# Patient Record
Sex: MALE | Race: WHITE | Employment: OTHER | URBAN - NONMETROPOLITAN AREA
[De-identification: names, ages, dates, MRNs, and addresses within clinical notes are randomized per-mention and may not be internally consistent; named-entity substitution may affect disease eponyms.]

---

## 2017-01-26 ENCOUNTER — OFFICE VISIT (OUTPATIENT)
Dept: AUDIOLOGY | Age: 77
End: 2017-01-26

## 2017-01-26 ENCOUNTER — OFFICE VISIT (OUTPATIENT)
Dept: FAMILY MEDICINE CLINIC | Age: 77
End: 2017-01-26

## 2017-01-26 VITALS
WEIGHT: 224.4 LBS | HEART RATE: 88 BPM | TEMPERATURE: 97.9 F | RESPIRATION RATE: 24 BRPM | DIASTOLIC BLOOD PRESSURE: 62 MMHG | SYSTOLIC BLOOD PRESSURE: 98 MMHG | BODY MASS INDEX: 31.74 KG/M2

## 2017-01-26 DIAGNOSIS — H90.3 SENSORINEURAL HEARING LOSS, BILATERAL: Primary | ICD-10-CM

## 2017-01-26 DIAGNOSIS — H61.21 IMPACTED CERUMEN OF RIGHT EAR: Primary | ICD-10-CM

## 2017-01-26 PROCEDURE — 1036F TOBACCO NON-USER: CPT | Performed by: FAMILY MEDICINE

## 2017-01-26 PROCEDURE — 69210 REMOVE IMPACTED EAR WAX UNI: CPT | Performed by: FAMILY MEDICINE

## 2017-02-03 RX ORDER — LISINOPRIL 20 MG/1
TABLET ORAL
Qty: 90 TABLET | Refills: 3 | Status: SHIPPED | OUTPATIENT
Start: 2017-02-03 | End: 2018-01-29 | Stop reason: SDUPTHER

## 2017-02-17 RX ORDER — ALLOPURINOL 100 MG/1
TABLET ORAL
Qty: 90 TABLET | Refills: 2 | Status: SHIPPED | OUTPATIENT
Start: 2017-02-17 | End: 2017-11-14 | Stop reason: SDUPTHER

## 2017-04-21 RX ORDER — GLIMEPIRIDE 2 MG/1
TABLET ORAL
Qty: 90 TABLET | Refills: 2 | Status: SHIPPED | OUTPATIENT
Start: 2017-04-21 | End: 2018-01-16 | Stop reason: SDUPTHER

## 2017-05-18 RX ORDER — MELOXICAM 15 MG/1
TABLET ORAL
Qty: 90 TABLET | Refills: 1 | Status: SHIPPED | OUTPATIENT
Start: 2017-05-18 | End: 2017-11-14 | Stop reason: SDUPTHER

## 2017-09-21 ENCOUNTER — OFFICE VISIT (OUTPATIENT)
Dept: FAMILY MEDICINE CLINIC | Age: 77
End: 2017-09-21
Payer: MEDICARE

## 2017-09-21 VITALS
WEIGHT: 225.6 LBS | BODY MASS INDEX: 34.19 KG/M2 | HEART RATE: 88 BPM | SYSTOLIC BLOOD PRESSURE: 132 MMHG | TEMPERATURE: 98 F | DIASTOLIC BLOOD PRESSURE: 68 MMHG | RESPIRATION RATE: 18 BRPM | HEIGHT: 68 IN

## 2017-09-21 DIAGNOSIS — I10 ESSENTIAL HYPERTENSION: Chronic | ICD-10-CM

## 2017-09-21 DIAGNOSIS — R19.7 DIARRHEA, UNSPECIFIED TYPE: ICD-10-CM

## 2017-09-21 DIAGNOSIS — L03.311 CELLULITIS OF ABDOMINAL WALL: Primary | ICD-10-CM

## 2017-09-21 PROCEDURE — 1036F TOBACCO NON-USER: CPT | Performed by: FAMILY MEDICINE

## 2017-09-21 PROCEDURE — 1123F ACP DISCUSS/DSCN MKR DOCD: CPT | Performed by: FAMILY MEDICINE

## 2017-09-21 PROCEDURE — 99213 OFFICE O/P EST LOW 20 MIN: CPT | Performed by: FAMILY MEDICINE

## 2017-09-21 PROCEDURE — 4040F PNEUMOC VAC/ADMIN/RCVD: CPT | Performed by: FAMILY MEDICINE

## 2017-09-21 PROCEDURE — G8427 DOCREV CUR MEDS BY ELIG CLIN: HCPCS | Performed by: FAMILY MEDICINE

## 2017-09-21 PROCEDURE — G8417 CALC BMI ABV UP PARAM F/U: HCPCS | Performed by: FAMILY MEDICINE

## 2017-09-21 RX ORDER — COLESEVELAM 180 1/1
1250 TABLET ORAL 2 TIMES DAILY WITH MEALS
Qty: 120 TABLET | Refills: 5 | Status: SHIPPED | OUTPATIENT
Start: 2017-09-21 | End: 2017-11-13 | Stop reason: CLARIF

## 2017-09-22 LAB
ABSOLUTE BASO #: 0.1 K/UL (ref 0–0.1)
ABSOLUTE EOS #: 0.3 K/UL (ref 0.1–0.4)
ABSOLUTE LYMPH #: 3.1 K/UL (ref 0.8–5.2)
ABSOLUTE MONO #: 0.8 K/UL (ref 0.1–0.9)
ABSOLUTE NEUT #: 4.9 K/UL (ref 1.3–9.1)
ALBUMIN SERPL-MCNC: 4.5 G/DL (ref 3.2–5.3)
ALK PHOSPHATASE: 106 IU/L (ref 35–121)
ALT SERPL-CCNC: 25 IU/L (ref 5–59)
ANION GAP SERPL CALCULATED.3IONS-SCNC: 14 MMOL/L
AST SERPL-CCNC: 21 IU/L (ref 10–42)
BASOPHILS RELATIVE PERCENT: 0.8 %
BILIRUB SERPL-MCNC: 0.7 MG/DL (ref 0.2–1.3)
BUN BLDV-MCNC: 15 MG/DL (ref 10–25)
CALCIUM SERPL-MCNC: 10 MG/DL (ref 8.7–10.8)
CHLORIDE BLD-SCNC: 103 MMOL/L (ref 95–111)
CHOLESTEROL/HDL RATIO: 4.9
CHOLESTEROL: 170 MG/DL
CO2: 27 MMOL/L (ref 21–32)
CREAT SERPL-MCNC: 0.9 MG/DL (ref 0.7–1.5)
EGFR AFRICAN AMERICAN: 99
EGFR IF NONAFRICAN AMERICAN: 82
EOSINOPHILS RELATIVE PERCENT: 3.6 %
GLUCOSE: 192 MG/DL (ref 70–100)
HCT VFR BLD CALC: 48.4 % (ref 41.4–51)
HDLC SERPL-MCNC: 35 MG/DL (ref 40–60)
HEMOGLOBIN: 16.4 G/DL (ref 13.8–17)
LDL CHOLESTEROL CALCULATED: 97 MG/DL
LDL/HDL RATIO: 2.8
LYMPHOCYTE %: 33 %
MCH RBC QN AUTO: 30.3 PG (ref 27–34)
MCHC RBC AUTO-ENTMCNC: 33.9 G/DL (ref 31–36)
MCV RBC AUTO: 89.3 FL (ref 80–100)
MONOCYTES # BLD: 8.9 %
NEUTROPHILS RELATIVE PERCENT: 53.4 %
PDW BLD-RTO: 12.2 % (ref 10.8–14.8)
PLATELETS: 231 K/UL (ref 150–450)
POTASSIUM SERPL-SCNC: 4.6 MMOL/L (ref 3.5–5.4)
RBC: 5.42 M/UL (ref 4–5.5)
SODIUM BLD-SCNC: 139 MMOL/L (ref 134–147)
TOTAL PROTEIN: 6.6 G/DL (ref 5.8–8)
TRIGL SERPL-MCNC: 189 MG/DL
VLDLC SERPL CALC-MCNC: 38 MG/DL
WBC: 9.2 K/UL (ref 3.7–10.8)

## 2017-09-23 LAB — AEROBIC CULTURE: NORMAL

## 2017-09-24 ASSESSMENT — ENCOUNTER SYMPTOMS
SORE THROAT: 0
SINUS PRESSURE: 0
COUGH: 0
EYE PAIN: 0
SHORTNESS OF BREATH: 0
CONSTIPATION: 0
DIARRHEA: 0
NAUSEA: 0
ABDOMINAL PAIN: 0
ABDOMINAL DISTENTION: 0
RHINORRHEA: 0

## 2017-09-28 ENCOUNTER — TELEPHONE (OUTPATIENT)
Dept: FAMILY MEDICINE CLINIC | Age: 77
End: 2017-09-28

## 2017-09-28 RX ORDER — AMOXICILLIN 500 MG/1
500 CAPSULE ORAL 3 TIMES DAILY
Qty: 30 CAPSULE | Refills: 0 | Status: SHIPPED | OUTPATIENT
Start: 2017-09-28 | End: 2017-10-08

## 2017-11-13 ENCOUNTER — TELEPHONE (OUTPATIENT)
Dept: FAMILY MEDICINE CLINIC | Age: 77
End: 2017-11-13

## 2017-11-13 RX ORDER — MONTELUKAST SODIUM 4 MG/1
1 TABLET, CHEWABLE ORAL 2 TIMES DAILY
Qty: 60 TABLET | Refills: 2 | Status: SHIPPED | OUTPATIENT
Start: 2017-11-13 | End: 2018-03-29 | Stop reason: SDUPTHER

## 2017-11-13 RX ORDER — AMOXICILLIN 500 MG/1
500 CAPSULE ORAL 3 TIMES DAILY
Qty: 30 CAPSULE | Refills: 0 | Status: SHIPPED | OUTPATIENT
Start: 2017-11-13 | End: 2017-11-23

## 2017-11-13 NOTE — TELEPHONE ENCOUNTER
The patient called and stated that he was prescribed Welchol on 9/21/17 and it was denied by his insurance co.  We did receive a denial on this on 10/21/17 and it is scanned into the chart. The patient is wanting to know if there is something else that can be prescribed instead.     Last office visit 9/21/17

## 2017-11-13 NOTE — TELEPHONE ENCOUNTER
The patient called in stating that he has a cough, chest congestion, coughing up yellow sputum, headache and sore throat. He states that his \"cough is horrible\". Started 4 days ago. He is wanting something called in to his pharmacy. Last office visit 9/21/17    The patient to call his pharmacy.

## 2017-11-14 RX ORDER — MELOXICAM 15 MG/1
TABLET ORAL
Qty: 90 TABLET | Refills: 2 | Status: SHIPPED | OUTPATIENT
Start: 2017-11-14 | End: 2018-08-11 | Stop reason: SDUPTHER

## 2017-11-14 RX ORDER — ALLOPURINOL 100 MG/1
TABLET ORAL
Qty: 90 TABLET | Refills: 2 | Status: SHIPPED | OUTPATIENT
Start: 2017-11-14 | End: 2018-08-11 | Stop reason: SDUPTHER

## 2017-12-28 ENCOUNTER — OFFICE VISIT (OUTPATIENT)
Dept: FAMILY MEDICINE CLINIC | Age: 77
End: 2017-12-28
Payer: MEDICARE

## 2017-12-28 VITALS
SYSTOLIC BLOOD PRESSURE: 120 MMHG | WEIGHT: 228 LBS | HEART RATE: 72 BPM | RESPIRATION RATE: 20 BRPM | TEMPERATURE: 98.4 F | BODY MASS INDEX: 34.67 KG/M2 | DIASTOLIC BLOOD PRESSURE: 64 MMHG

## 2017-12-28 DIAGNOSIS — J01.90 ACUTE BACTERIAL SINUSITIS: Primary | ICD-10-CM

## 2017-12-28 DIAGNOSIS — B96.89 ACUTE BACTERIAL SINUSITIS: Primary | ICD-10-CM

## 2017-12-28 PROCEDURE — 4040F PNEUMOC VAC/ADMIN/RCVD: CPT | Performed by: FAMILY MEDICINE

## 2017-12-28 PROCEDURE — 1123F ACP DISCUSS/DSCN MKR DOCD: CPT | Performed by: FAMILY MEDICINE

## 2017-12-28 PROCEDURE — 1036F TOBACCO NON-USER: CPT | Performed by: FAMILY MEDICINE

## 2017-12-28 PROCEDURE — 99213 OFFICE O/P EST LOW 20 MIN: CPT | Performed by: FAMILY MEDICINE

## 2017-12-28 PROCEDURE — G8417 CALC BMI ABV UP PARAM F/U: HCPCS | Performed by: FAMILY MEDICINE

## 2017-12-28 PROCEDURE — G8484 FLU IMMUNIZE NO ADMIN: HCPCS | Performed by: FAMILY MEDICINE

## 2017-12-28 PROCEDURE — G8427 DOCREV CUR MEDS BY ELIG CLIN: HCPCS | Performed by: FAMILY MEDICINE

## 2017-12-28 RX ORDER — DOXYCYCLINE HYCLATE 100 MG
100 TABLET ORAL 2 TIMES DAILY
Qty: 20 TABLET | Refills: 0 | Status: SHIPPED | OUTPATIENT
Start: 2017-12-28 | End: 2018-01-07

## 2017-12-29 ASSESSMENT — ENCOUNTER SYMPTOMS
SINUS PAIN: 1
SHORTNESS OF BREATH: 0
EYE PAIN: 0
ABDOMINAL DISTENTION: 0
ABDOMINAL PAIN: 0
COUGH: 1
CONSTIPATION: 0
RHINORRHEA: 1
SINUS PRESSURE: 1
SORE THROAT: 1
DIARRHEA: 0
NAUSEA: 0

## 2017-12-29 NOTE — PROGRESS NOTES
Neurological: He is alert and oriented to person, place, and time. Skin: Skin is warm and dry. He is not diaphoretic. No pallor. /64   Pulse 72   Temp 98.4 °F (36.9 °C) (Oral)   Resp 20   Wt 228 lb (103.4 kg)   BMI 34.67 kg/m²       Impression/Plan:  1. Acute bacterial sinusitis      Requested Prescriptions     Signed Prescriptions Disp Refills    doxycycline hyclate (VIBRA-TABS) 100 MG tablet 20 tablet 0     Sig: Take 1 tablet by mouth 2 times daily for 10 days     No orders of the defined types were placed in this encounter. (Colestid for diarrhea  Patient given educational materials - see patient instructions. Discussed use, benefit, and side effects of prescribed medications. All patient questions answered. Pt voiced understanding. Reviewed health maintenance. Patient agreed with treatment plan. Follow up as directed. **This report has been created using voice recognition software. It may contain minor errors which are inherent in voice recognition technology. **       Electronically signed by Nelly Roman MD on 12/29/2017 at 2:21 PM

## 2018-01-16 RX ORDER — GLIMEPIRIDE 2 MG/1
TABLET ORAL
Qty: 90 TABLET | Refills: 2 | Status: SHIPPED | OUTPATIENT
Start: 2018-01-16 | End: 2018-10-15 | Stop reason: SDUPTHER

## 2018-01-29 RX ORDER — LISINOPRIL 20 MG/1
TABLET ORAL
Qty: 90 TABLET | Refills: 2 | Status: SHIPPED | OUTPATIENT
Start: 2018-01-29 | End: 2018-10-28 | Stop reason: SDUPTHER

## 2018-02-12 ENCOUNTER — TELEPHONE (OUTPATIENT)
Dept: FAMILY MEDICINE CLINIC | Age: 78
End: 2018-02-12

## 2018-02-12 RX ORDER — AMOXICILLIN 500 MG/1
500 CAPSULE ORAL 3 TIMES DAILY
Qty: 30 CAPSULE | Refills: 0 | Status: SHIPPED | OUTPATIENT
Start: 2018-02-12 | End: 2018-02-22

## 2018-02-12 NOTE — TELEPHONE ENCOUNTER
Facial and eye pressure, teeth hurt. Has gone thru 2 boxes of kleenex, productive cough. [facility] is full today. Requesting treatment.      Radha  [facility] [place]

## 2018-03-19 ENCOUNTER — TELEPHONE (OUTPATIENT)
Dept: FAMILY MEDICINE CLINIC | Age: 78
End: 2018-03-19

## 2018-03-23 ENCOUNTER — TELEPHONE (OUTPATIENT)
Dept: FAMILY MEDICINE CLINIC | Age: 78
End: 2018-03-23

## 2018-03-26 NOTE — TELEPHONE ENCOUNTER
Pt left a voicemail on nurse line stating to send a Rx to [facility], but did not say what he is requesting. I left another message for him to call back to discuss or let us know what he is needing.

## 2018-03-29 RX ORDER — MONTELUKAST SODIUM 4 MG/1
1 TABLET, CHEWABLE ORAL 2 TIMES DAILY
Qty: 60 TABLET | Refills: 5 | Status: SHIPPED | OUTPATIENT
Start: 2018-03-29 | End: 2018-11-01 | Stop reason: SDUPTHER

## 2018-05-15 ENCOUNTER — OFFICE VISIT (OUTPATIENT)
Dept: FAMILY MEDICINE CLINIC | Age: 78
End: 2018-05-15
Payer: MEDICARE

## 2018-05-15 ENCOUNTER — TELEPHONE (OUTPATIENT)
Dept: FAMILY MEDICINE CLINIC | Age: 78
End: 2018-05-15

## 2018-05-15 VITALS
TEMPERATURE: 98.7 F | SYSTOLIC BLOOD PRESSURE: 138 MMHG | RESPIRATION RATE: 16 BRPM | HEART RATE: 68 BPM | DIASTOLIC BLOOD PRESSURE: 60 MMHG | HEIGHT: 68 IN | WEIGHT: 228.6 LBS | BODY MASS INDEX: 34.65 KG/M2

## 2018-05-15 DIAGNOSIS — E11.9 TYPE II OR UNSPECIFIED TYPE DIABETES MELLITUS WITHOUT MENTION OF COMPLICATION, NOT STATED AS UNCONTROLLED: ICD-10-CM

## 2018-05-15 DIAGNOSIS — J01.90 ACUTE NON-RECURRENT SINUSITIS, UNSPECIFIED LOCATION: Primary | ICD-10-CM

## 2018-05-15 DIAGNOSIS — E11.9 CONTROLLED TYPE 2 DIABETES MELLITUS WITHOUT COMPLICATION, WITHOUT LONG-TERM CURRENT USE OF INSULIN (HCC): ICD-10-CM

## 2018-05-15 DIAGNOSIS — E11.9 TYPE 2 DIABETES MELLITUS WITHOUT COMPLICATION, WITHOUT LONG-TERM CURRENT USE OF INSULIN (HCC): ICD-10-CM

## 2018-05-15 DIAGNOSIS — Z00.00 LABORATORY EXAMINATION ORDERED AS PART OF A ROUTINE GENERAL MEDICAL EXAMINATION: ICD-10-CM

## 2018-05-15 DIAGNOSIS — I10 ESSENTIAL HYPERTENSION: Chronic | ICD-10-CM

## 2018-05-15 DIAGNOSIS — E11.9 CONTROLLED TYPE 2 DIABETES MELLITUS WITHOUT COMPLICATION, WITHOUT LONG-TERM CURRENT USE OF INSULIN (HCC): Primary | ICD-10-CM

## 2018-05-15 PROCEDURE — G8427 DOCREV CUR MEDS BY ELIG CLIN: HCPCS | Performed by: FAMILY MEDICINE

## 2018-05-15 PROCEDURE — G8417 CALC BMI ABV UP PARAM F/U: HCPCS | Performed by: FAMILY MEDICINE

## 2018-05-15 PROCEDURE — 1123F ACP DISCUSS/DSCN MKR DOCD: CPT | Performed by: FAMILY MEDICINE

## 2018-05-15 PROCEDURE — 1036F TOBACCO NON-USER: CPT | Performed by: FAMILY MEDICINE

## 2018-05-15 PROCEDURE — 99213 OFFICE O/P EST LOW 20 MIN: CPT | Performed by: FAMILY MEDICINE

## 2018-05-15 PROCEDURE — 4040F PNEUMOC VAC/ADMIN/RCVD: CPT | Performed by: FAMILY MEDICINE

## 2018-05-15 RX ORDER — AZITHROMYCIN 500 MG/1
500 TABLET, FILM COATED ORAL DAILY
Qty: 3 TABLET | Refills: 0 | Status: SHIPPED | OUTPATIENT
Start: 2018-05-15 | End: 2018-05-18

## 2018-05-15 ASSESSMENT — PATIENT HEALTH QUESTIONNAIRE - PHQ9
1. LITTLE INTEREST OR PLEASURE IN DOING THINGS: 0
SUM OF ALL RESPONSES TO PHQ9 QUESTIONS 1 & 2: 0
2. FEELING DOWN, DEPRESSED OR HOPELESS: 0
SUM OF ALL RESPONSES TO PHQ QUESTIONS 1-9: 0

## 2018-05-15 ASSESSMENT — ENCOUNTER SYMPTOMS
EYE PAIN: 0
RHINORRHEA: 1
ABDOMINAL PAIN: 0
SORE THROAT: 1
NAUSEA: 0
DIARRHEA: 0
SINUS PRESSURE: 1
COUGH: 1
SHORTNESS OF BREATH: 0
ABDOMINAL DISTENTION: 0
CONSTIPATION: 0

## 2018-08-13 RX ORDER — ALLOPURINOL 100 MG/1
TABLET ORAL
Qty: 90 TABLET | Refills: 2 | Status: SHIPPED | OUTPATIENT
Start: 2018-08-13 | End: 2019-05-12 | Stop reason: SDUPTHER

## 2018-08-13 RX ORDER — MELOXICAM 15 MG/1
TABLET ORAL
Qty: 90 TABLET | Refills: 2 | Status: SHIPPED | OUTPATIENT
Start: 2018-08-13 | End: 2019-05-12 | Stop reason: SDUPTHER

## 2018-10-04 ENCOUNTER — CARE COORDINATION (OUTPATIENT)
Dept: CARE COORDINATION | Age: 78
End: 2018-10-04

## 2018-10-15 RX ORDER — GLIMEPIRIDE 2 MG/1
TABLET ORAL
Qty: 90 TABLET | Refills: 2 | Status: SHIPPED | OUTPATIENT
Start: 2018-10-15

## 2018-10-29 ENCOUNTER — TELEPHONE (OUTPATIENT)
Dept: FAMILY MEDICINE CLINIC | Age: 78
End: 2018-10-29

## 2018-10-30 LAB
ALBUMIN SERPL-MCNC: 4.5 G/DL (ref 3.5–5.2)
ALK PHOSPHATASE: 101 U/L (ref 39–118)
ALT SERPL-CCNC: 21 U/L (ref 5–50)
ANION GAP SERPL CALCULATED.3IONS-SCNC: 10 MEQ/L (ref 10–19)
AST SERPL-CCNC: 19 U/L (ref 9–50)
AVERAGE GLUCOSE: 128 MG/DL (ref 66–114)
BILIRUB SERPL-MCNC: 0.8 MG/DL
BUN BLDV-MCNC: 12 MG/DL (ref 8–23)
CALCIUM SERPL-MCNC: 9.5 MG/DL (ref 8.5–10.5)
CHLORIDE BLD-SCNC: 101 MEQ/L (ref 95–107)
CHOLESTEROL/HDL RATIO: 4.2
CHOLESTEROL: 152 MG/DL
CO2: 28 MEQ/L (ref 19–31)
CREAT SERPL-MCNC: 1 MG/DL (ref 0.8–1.4)
EGFR AFRICAN AMERICAN: 83.8 ML/MIN/1.73 M2
EGFR IF NONAFRICAN AMERICAN: 72.3 ML/MIN/1.73 M2
GLUCOSE: 133 MG/DL (ref 70–99)
HBA1C MFR BLD: 6.1 % (ref 4.2–5.8)
HDLC SERPL-MCNC: 35.8 MG/DL
LDL CHOLESTEROL CALCULATED: 98 MG/DL
LDL/HDL RATIO: 2.7
POTASSIUM SERPL-SCNC: 4.3 MEQ/L (ref 3.5–5.4)
SODIUM BLD-SCNC: 139 MEQ/L (ref 135–146)
TOTAL PROTEIN: 6.8 G/DL (ref 6.1–8.3)
TRIGL SERPL-MCNC: 93 MG/DL
VLDLC SERPL CALC-MCNC: 19 MG/DL

## 2018-10-30 RX ORDER — LISINOPRIL 20 MG/1
TABLET ORAL
Qty: 90 TABLET | Refills: 1 | Status: SHIPPED | OUTPATIENT
Start: 2018-10-30 | End: 2019-04-28 | Stop reason: SDUPTHER

## 2018-11-01 ENCOUNTER — NURSE ONLY (OUTPATIENT)
Dept: FAMILY MEDICINE CLINIC | Age: 78
End: 2018-11-01
Payer: MEDICARE

## 2018-11-01 DIAGNOSIS — Z23 NEEDS FLU SHOT: Primary | ICD-10-CM

## 2018-11-01 PROCEDURE — 90662 IIV NO PRSV INCREASED AG IM: CPT | Performed by: FAMILY MEDICINE

## 2018-11-01 PROCEDURE — G0008 ADMIN INFLUENZA VIRUS VAC: HCPCS | Performed by: FAMILY MEDICINE

## 2018-11-01 RX ORDER — MONTELUKAST SODIUM 4 MG/1
1 TABLET, CHEWABLE ORAL 2 TIMES DAILY
Qty: 180 TABLET | Refills: 3 | Status: SHIPPED | OUTPATIENT
Start: 2018-11-01 | End: 2019-10-15 | Stop reason: SDUPTHER

## 2019-04-29 RX ORDER — LISINOPRIL 20 MG/1
TABLET ORAL
Qty: 90 TABLET | Refills: 0 | Status: SHIPPED | OUTPATIENT
Start: 2019-04-29

## 2019-05-13 RX ORDER — ALLOPURINOL 100 MG/1
TABLET ORAL
Qty: 90 TABLET | Refills: 0 | Status: SHIPPED | OUTPATIENT
Start: 2019-05-13

## 2019-05-13 RX ORDER — MELOXICAM 15 MG/1
TABLET ORAL
Qty: 90 TABLET | Refills: 0 | Status: SHIPPED | OUTPATIENT
Start: 2019-05-13

## 2019-07-17 RX ORDER — GLIMEPIRIDE 2 MG/1
TABLET ORAL
Qty: 90 TABLET | Refills: 2 | OUTPATIENT
Start: 2019-07-17

## 2019-07-29 RX ORDER — LISINOPRIL 20 MG/1
TABLET ORAL
Qty: 90 TABLET | Refills: 0 | OUTPATIENT
Start: 2019-07-29

## 2019-08-15 RX ORDER — ALLOPURINOL 100 MG/1
TABLET ORAL
Qty: 90 TABLET | Refills: 0 | OUTPATIENT
Start: 2019-08-15

## 2019-08-15 RX ORDER — MELOXICAM 15 MG/1
TABLET ORAL
Qty: 90 TABLET | Refills: 0 | OUTPATIENT
Start: 2019-08-15

## 2019-09-30 ENCOUNTER — OFFICE VISIT (OUTPATIENT)
Dept: FAMILY MEDICINE CLINIC | Age: 79
End: 2019-09-30
Payer: MEDICARE

## 2019-09-30 VITALS
RESPIRATION RATE: 16 BRPM | HEART RATE: 96 BPM | HEIGHT: 68 IN | SYSTOLIC BLOOD PRESSURE: 122 MMHG | WEIGHT: 219.4 LBS | TEMPERATURE: 97.7 F | DIASTOLIC BLOOD PRESSURE: 60 MMHG | BODY MASS INDEX: 33.25 KG/M2

## 2019-09-30 DIAGNOSIS — I10 ESSENTIAL HYPERTENSION: Chronic | ICD-10-CM

## 2019-09-30 DIAGNOSIS — E11.9 TYPE II OR UNSPECIFIED TYPE DIABETES MELLITUS WITHOUT MENTION OF COMPLICATION, NOT STATED AS UNCONTROLLED: ICD-10-CM

## 2019-09-30 DIAGNOSIS — R10.13 ABDOMINAL PAIN, EPIGASTRIC: Primary | ICD-10-CM

## 2019-09-30 DIAGNOSIS — E11.9 CONTROLLED TYPE 2 DIABETES MELLITUS WITHOUT COMPLICATION, WITHOUT LONG-TERM CURRENT USE OF INSULIN (HCC): ICD-10-CM

## 2019-09-30 PROCEDURE — 3288F FALL RISK ASSESSMENT DOCD: CPT | Performed by: FAMILY MEDICINE

## 2019-09-30 PROCEDURE — G0008 ADMIN INFLUENZA VIRUS VAC: HCPCS | Performed by: FAMILY MEDICINE

## 2019-09-30 PROCEDURE — 99213 OFFICE O/P EST LOW 20 MIN: CPT | Performed by: FAMILY MEDICINE

## 2019-09-30 PROCEDURE — 90653 IIV ADJUVANT VACCINE IM: CPT | Performed by: FAMILY MEDICINE

## 2019-09-30 PROCEDURE — G8510 SCR DEP NEG, NO PLAN REQD: HCPCS | Performed by: FAMILY MEDICINE

## 2019-09-30 ASSESSMENT — ENCOUNTER SYMPTOMS
ABDOMINAL PAIN: 1
SINUS PRESSURE: 0
EYE PAIN: 0
DIARRHEA: 0
SHORTNESS OF BREATH: 0
ABDOMINAL DISTENTION: 0
COUGH: 0
SORE THROAT: 0
CONSTIPATION: 0
NAUSEA: 0
RHINORRHEA: 0

## 2019-09-30 ASSESSMENT — PATIENT HEALTH QUESTIONNAIRE - PHQ9
SUM OF ALL RESPONSES TO PHQ9 QUESTIONS 1 & 2: 0
1. LITTLE INTEREST OR PLEASURE IN DOING THINGS: 0
SUM OF ALL RESPONSES TO PHQ QUESTIONS 1-9: 0
SUM OF ALL RESPONSES TO PHQ QUESTIONS 1-9: 0
2. FEELING DOWN, DEPRESSED OR HOPELESS: 0

## 2019-09-30 NOTE — PROGRESS NOTES
Diabetes Sister      Social History     Tobacco Use    Smoking status: Never Smoker    Smokeless tobacco: Never Used   Substance Use Topics    Alcohol use: No      Current Outpatient Medications   Medication Sig Dispense Refill    meloxicam (MOBIC) 15 MG tablet TAKE 1 TABLET DAILY 90 tablet 0    allopurinol (ZYLOPRIM) 100 MG tablet TAKE 1 TABLET DAILY 90 tablet 0    lisinopril (PRINIVIL;ZESTRIL) 20 MG tablet TAKE 1 TABLET DAILY 90 tablet 0    colestipol (COLESTID) 1 g tablet Take 1 tablet by mouth 2 times daily 180 tablet 3    glimepiride (AMARYL) 2 MG tablet TAKE 1 TABLET EVERY MORNING 90 tablet 2    Cinnamon 500 MG CAPS Take  by mouth daily.  Lecithin 1200 MG CAPS Take 1 tablet by mouth daily.  BEE POLLEN PO Take  by mouth daily.  Ascorbic Acid (VITAMIN C) 500 MG tablet Take 500 mg by mouth daily. No current facility-administered medications for this visit. Allergies   Allergen Reactions    Quinine Derivatives Hives     Health Maintenance   Topic Date Due    DTaP/Tdap/Td vaccine (1 - Tdap) 07/19/1960    Shingles Vaccine (1 of 2) 07/19/1991    Pneumococcal 65+ years Vaccine (2 of 2 - PCV13) 11/22/2017    Annual Wellness Visit (AWV)  06/20/2019    Potassium monitoring  10/29/2019    Creatinine monitoring  10/29/2019    Flu vaccine  Completed         Objective:     Physical Exam   Constitutional: He is oriented to person, place, and time. He appears well-developed and well-nourished. No distress. HENT:   Head: Normocephalic and atraumatic. Right Ear: External ear normal.   Left Ear: External ear normal.   Eyes: Conjunctivae are normal.   Neck: No JVD present. Cardiovascular: Normal rate, regular rhythm and normal heart sounds. Pulmonary/Chest: Effort normal and breath sounds normal. He has no wheezes. He has no rales. Musculoskeletal: He exhibits no edema or tenderness. Neurological: He is alert and oriented to person, place, and time.    Skin: Skin is warm

## 2019-10-15 RX ORDER — MONTELUKAST SODIUM 4 MG/1
TABLET, CHEWABLE ORAL
Qty: 180 TABLET | Refills: 4 | Status: SHIPPED | OUTPATIENT
Start: 2019-10-15

## 2020-01-01 ENCOUNTER — HOSPITAL ENCOUNTER (OUTPATIENT)
Age: 80
Setting detail: SPECIMEN
Discharge: HOME | End: 2020-10-29
Payer: MEDICARE

## 2020-01-01 ENCOUNTER — OFFICE VISIT (OUTPATIENT)
Dept: FAMILY MEDICINE CLINIC | Age: 80
End: 2020-01-01
Payer: MEDICARE

## 2020-01-01 ENCOUNTER — TELEPHONE (OUTPATIENT)
Dept: FAMILY MEDICINE CLINIC | Age: 80
End: 2020-01-01

## 2020-01-01 VITALS
SYSTOLIC BLOOD PRESSURE: 128 MMHG | TEMPERATURE: 97.2 F | WEIGHT: 207.2 LBS | HEART RATE: 84 BPM | DIASTOLIC BLOOD PRESSURE: 64 MMHG | RESPIRATION RATE: 16 BRPM | BODY MASS INDEX: 31.74 KG/M2

## 2020-01-01 VITALS
WEIGHT: 210.8 LBS | TEMPERATURE: 97.9 F | DIASTOLIC BLOOD PRESSURE: 74 MMHG | BODY MASS INDEX: 32.29 KG/M2 | HEART RATE: 86 BPM | RESPIRATION RATE: 16 BRPM | SYSTOLIC BLOOD PRESSURE: 132 MMHG

## 2020-01-01 PROCEDURE — 99213 OFFICE O/P EST LOW 20 MIN: CPT | Performed by: NURSE PRACTITIONER

## 2020-01-01 PROCEDURE — 96372 THER/PROPH/DIAG INJ SC/IM: CPT | Performed by: FAMILY MEDICINE

## 2020-01-01 PROCEDURE — G8510 SCR DEP NEG, NO PLAN REQD: HCPCS | Performed by: NURSE PRACTITIONER

## 2020-01-01 PROCEDURE — U0003 INFECTIOUS AGENT DETECTION BY NUCLEIC ACID (DNA OR RNA); SEVERE ACUTE RESPIRATORY SYNDROME CORONAVIRUS 2 (SARS-COV-2) (CORONAVIRUS DISEASE [COVID-19]), AMPLIFIED PROBE TECHNIQUE, MAKING USE OF HIGH THROUGHPUT TECHNOLOGIES AS DESCRIBED BY CMS-2020-01-R: HCPCS

## 2020-01-01 PROCEDURE — 99213 OFFICE O/P EST LOW 20 MIN: CPT | Performed by: FAMILY MEDICINE

## 2020-01-01 RX ORDER — CEPHALEXIN 500 MG/1
500 CAPSULE ORAL 4 TIMES DAILY
Qty: 40 CAPSULE | Refills: 0 | Status: SHIPPED | OUTPATIENT
Start: 2020-01-01 | End: 2020-01-01

## 2020-01-01 RX ORDER — PREDNISONE 1 MG/1
TABLET ORAL
Qty: 30 TABLET | Refills: 0 | Status: SHIPPED | OUTPATIENT
Start: 2020-01-01 | End: 2020-01-01

## 2020-01-01 RX ORDER — PENICILLIN V POTASSIUM 500 MG/1
TABLET ORAL
COMMUNITY
Start: 2020-01-01 | End: 2020-01-01

## 2020-01-01 RX ORDER — ACETAMINOPHEN AND CODEINE PHOSPHATE 300; 30 MG/1; MG/1
TABLET ORAL
COMMUNITY
Start: 2020-01-01

## 2020-01-01 RX ORDER — METHYLPREDNISOLONE ACETATE 80 MG/ML
160 INJECTION, SUSPENSION INTRA-ARTICULAR; INTRALESIONAL; INTRAMUSCULAR; SOFT TISSUE ONCE
Status: COMPLETED | OUTPATIENT
Start: 2020-01-01 | End: 2020-01-01

## 2020-01-01 RX ADMIN — METHYLPREDNISOLONE ACETATE 160 MG: 80 INJECTION, SUSPENSION INTRA-ARTICULAR; INTRALESIONAL; INTRAMUSCULAR; SOFT TISSUE at 14:57

## 2020-01-01 SDOH — ECONOMIC STABILITY: TRANSPORTATION INSECURITY
IN THE PAST 12 MONTHS, HAS THE LACK OF TRANSPORTATION KEPT YOU FROM MEDICAL APPOINTMENTS OR FROM GETTING MEDICATIONS?: NO

## 2020-01-01 SDOH — ECONOMIC STABILITY: FOOD INSECURITY: WITHIN THE PAST 12 MONTHS, THE FOOD YOU BOUGHT JUST DIDN'T LAST AND YOU DIDN'T HAVE MONEY TO GET MORE.: NEVER TRUE

## 2020-01-01 SDOH — ECONOMIC STABILITY: INCOME INSECURITY: HOW HARD IS IT FOR YOU TO PAY FOR THE VERY BASICS LIKE FOOD, HOUSING, MEDICAL CARE, AND HEATING?: NOT HARD AT ALL

## 2020-01-01 SDOH — ECONOMIC STABILITY: FOOD INSECURITY: WITHIN THE PAST 12 MONTHS, YOU WORRIED THAT YOUR FOOD WOULD RUN OUT BEFORE YOU GOT MONEY TO BUY MORE.: NEVER TRUE

## 2020-01-01 SDOH — ECONOMIC STABILITY: TRANSPORTATION INSECURITY
IN THE PAST 12 MONTHS, HAS LACK OF TRANSPORTATION KEPT YOU FROM MEETINGS, WORK, OR FROM GETTING THINGS NEEDED FOR DAILY LIVING?: NO

## 2020-01-01 ASSESSMENT — ENCOUNTER SYMPTOMS
CONSTIPATION: 0
SHORTNESS OF BREATH: 0
SORE THROAT: 0
RHINORRHEA: 0
SINUS PRESSURE: 0
COUGH: 0
NAUSEA: 0
ABDOMINAL PAIN: 0
ABDOMINAL DISTENTION: 0
EYE PAIN: 0
DIARRHEA: 0

## 2020-01-01 ASSESSMENT — PATIENT HEALTH QUESTIONNAIRE - PHQ9
1. LITTLE INTEREST OR PLEASURE IN DOING THINGS: 0
SUM OF ALL RESPONSES TO PHQ9 QUESTIONS 1 & 2: 0
SUM OF ALL RESPONSES TO PHQ QUESTIONS 1-9: 0
SUM OF ALL RESPONSES TO PHQ QUESTIONS 1-9: 0
2. FEELING DOWN, DEPRESSED OR HOPELESS: 0

## 2020-06-30 NOTE — PROGRESS NOTES
[address] [place] [facility] [place] [address]  Dept: [phone]  Dept Fax: [phone]  Loc: [phone]  PROGRESS NOTE      VisitDate: 6/29/2020    Camron Rea is a 67 y.o. male who presents today for:     Chief Complaint   Patient presents with    Leg Pain     4-5 days, denies injury, left leg, swollen, using biofreeze, wonders if he was bitten by a spider         Subjective:  Patient presents with complaint of left lower leg swelling for the past 5 days. Reports possible spider bite. Patient reports tenderness left ankle. Denies any fever, chest pain, shortness of breath, rash. Applying Biofreeze with minimal results. Denies any specific injury. Reports that he does routinely kick dog buckets full of food. Has noticed some bruising in his great toes due to this activity. History of hypertension and type 2 diabetes. Review of Systems   Constitutional: Negative for fever. Cardiovascular: Positive for leg swelling. All other systems reviewed and are negative.     Past Medical History:   Diagnosis Date    Hypertension     Type II or unspecified type diabetes mellitus without mention of complication, not stated as uncontrolled       Past Surgical History:   Procedure Laterality Date    CHOLECYSTECTOMY, OPEN  11/23/2016    attempted laparoscopic    KNEE ARTHROSCOPY  2012    TONSILLECTOMY       Family History   Problem Relation Age of Onset    Stroke Father     Diabetes Sister      Social History     Tobacco Use    Smoking status: Never Smoker    Smokeless tobacco: Never Used   Substance Use Topics    Alcohol use: No      Current Outpatient Medications   Medication Sig Dispense Refill    predniSONE (DELTASONE) 5 MG tablet 4 po qd for 3 days, then 3 po qd for 3 days, then 2 po qd for 3 days, then 1 po qd for 3 days 30 tablet 0    cephALEXin (KEFLEX) 500 MG capsule Take 1 capsule by mouth 4 times daily for 10 days 40

## 2020-07-09 NOTE — PROGRESS NOTES
Administrations This Visit     methylPREDNISolone acetate (DEPO-MEDROL) injection 160 mg     Admin Date  07/09/2020  14:57 Action  Given Dose  160 mg Route  Intramuscular Site  Deltoid Left Administered By  Mary Ellen Redding RN    Ordering Provider:  Nilda Haney MD    NDC:  9623-6554-24    Lot#:  [id number]    :  [employer]    Patient Supplied?:  No    Comments:  EXP 6/2021

## 2020-07-15 NOTE — PROGRESS NOTES
[address] [place] [facility] [address]  Dept: [phone]  Dept Fax: [phone]  Loc: [phone]  PROGRESS NOTE      VisitDate: 7/9/2020    Jasmyne Recinos is a 67 y.o. male who presents today for:     Chief Complaint   Patient presents with    Rash     all over rash, began 3 days ago, taking keflex and pcn-last dose last night, itchy, hasn't used anything on it yet    Joint Pain     al over joint pain    Jaw Pain     front teeth pulled, c/o jaw pain, hard to ear         Subjective:  HPI  Patient comes in with diffuse rash she was given some antibiotics after having some teeth pulled and developed a widespread maculopapular rash. Has not responded to over-the-counter medications. Having joint pain stiffness diffusely gets better with activity    Review of Systems   Constitutional: Negative for appetite change and fever. HENT: Negative for congestion, ear pain, postnasal drip, rhinorrhea, sinus pressure and sore throat. Eyes: Negative for pain and visual disturbance. Respiratory: Negative for cough and shortness of breath. Cardiovascular: Negative for chest pain. Gastrointestinal: Negative for abdominal distention, abdominal pain, constipation, diarrhea and nausea. Genitourinary: Negative for dysuria, frequency and urgency. Musculoskeletal: Positive for arthralgias. Skin: Positive for rash. Neurological: Negative for dizziness.      Past Medical History:   Diagnosis Date    Hypertension     Type II or unspecified type diabetes mellitus without mention of complication, not stated as uncontrolled       Past Surgical History:   Procedure Laterality Date    CHOLECYSTECTOMY, OPEN  11/23/2016    attempted laparoscopic    KNEE ARTHROSCOPY  2012    TONSILLECTOMY       Family History   Problem Relation Age of Onset    Stroke Father     Diabetes Sister      Social History     Tobacco Use    Smoking status: Never Smoker  Smokeless tobacco: Never Used   Substance Use Topics    Alcohol use: No      Current Outpatient Medications   Medication Sig Dispense Refill    nystatin (MYCOSTATIN) 341068 UNIT/ML suspension Take 5 mLs by mouth 4 times daily for 10 days 200 mL 0    colestipol (COLESTID) 1 g tablet TAKE 1 TABLET TWICE A  tablet 4    meloxicam (MOBIC) 15 MG tablet TAKE 1 TABLET DAILY 90 tablet 0    allopurinol (ZYLOPRIM) 100 MG tablet TAKE 1 TABLET DAILY 90 tablet 0    lisinopril (PRINIVIL;ZESTRIL) 20 MG tablet TAKE 1 TABLET DAILY 90 tablet 0    glimepiride (AMARYL) 2 MG tablet TAKE 1 TABLET EVERY MORNING 90 tablet 2    Cinnamon 500 MG CAPS Take  by mouth daily.  Lecithin 1200 MG CAPS Take 1 tablet by mouth daily.  BEE POLLEN PO Take  by mouth daily.  Ascorbic Acid (VITAMIN C) 500 MG tablet Take 500 mg by mouth daily.  acetaminophen-codeine (TYLENOL #3) 300-30 MG per tablet take 1 tablet by mouth every 4 to 6 hours if needed for pain       No current facility-administered medications for this visit. Allergies   Allergen Reactions    Quinine Derivatives Hives    Penicillins Rash     Health Maintenance   Topic Date Due    DTaP/Tdap/Td vaccine (1 - Tdap) 07/19/1960    Shingles Vaccine (1 of 2) 07/19/1991    Annual Wellness Visit (AWV)  06/20/2019    Potassium monitoring  10/29/2019    Creatinine monitoring  10/29/2019    Flu vaccine (1) 09/01/2020    Pneumococcal 65+ years Vaccine  Completed    Hepatitis A vaccine  Aged Out    Hib vaccine  Aged Out    Meningococcal (ACWY) vaccine  Aged Out         Objective:     Physical Exam  Constitutional:       General: He is not in acute distress. Appearance: He is well-developed. He is not diaphoretic. HENT:      Head: Normocephalic and atraumatic. Right Ear: External ear normal.      Left Ear: External ear normal.   Eyes:      Conjunctiva/sclera: Conjunctivae normal.   Neck:      Vascular: No JVD.    Cardiovascular:      Rate

## 2020-10-31 LAB — SARS-COV-2: NOT DETECTED
